# Patient Record
Sex: FEMALE | URBAN - METROPOLITAN AREA
[De-identification: names, ages, dates, MRNs, and addresses within clinical notes are randomized per-mention and may not be internally consistent; named-entity substitution may affect disease eponyms.]

---

## 2023-05-22 ENCOUNTER — OFFICE VISIT (OUTPATIENT)
Dept: URBAN - METROPOLITAN AREA CLINIC 13 | Facility: CLINIC | Age: 68
End: 2023-05-22
Payer: MEDICARE

## 2023-05-22 DIAGNOSIS — H35.373 PUCKERING OF MACULA, BILATERAL: Primary | ICD-10-CM

## 2023-05-22 DIAGNOSIS — H25.12 AGE-RELATED NUCLEAR CATARACT, LEFT EYE: ICD-10-CM

## 2023-05-22 DIAGNOSIS — Z96.1 PRESENCE OF INTRAOCULAR LENS: ICD-10-CM

## 2023-05-22 DIAGNOSIS — H43.813 VITREOUS DEGENERATION, BILATERAL: ICD-10-CM

## 2023-05-22 PROCEDURE — 92134 CPTRZ OPH DX IMG PST SGM RTA: CPT | Performed by: OPHTHALMOLOGY

## 2023-05-22 PROCEDURE — 99204 OFFICE O/P NEW MOD 45 MIN: CPT | Performed by: OPHTHALMOLOGY

## 2023-05-22 ASSESSMENT — INTRAOCULAR PRESSURE
OD: 10
OS: 13

## 2023-05-22 NOTE — IMPRESSION/PLAN
Impression: Puckering of macula, bilateral: H35.373. Bilateral.

OCT: 
OD: ERM 
OS: ERM Plan: Examination and OCT reveals an ERM which is distorting the macular contour. The diagnosis, natural history, and prognosis of epiretinal membranes, as well as the risks and benefits of vitrectomy with membrane peeling versus observation were discussed at length. Given the patient's current visual acuity and hindrance on activities of daily living, surgical correction was recommended. The patient understands that while the distortion should vielka, the final acuity, which can take months to achieve, may or may not be an improvement over baseline. 

PLAN: 25g PPV, ERM-ILM peel OD x ERM (30mins non-urgent)

## 2023-06-20 ENCOUNTER — POST-OPERATIVE VISIT (OUTPATIENT)
Dept: URBAN - METROPOLITAN AREA CLINIC 13 | Facility: CLINIC | Age: 68
End: 2023-06-20
Payer: MEDICARE

## 2023-06-20 DIAGNOSIS — H35.373 PUCKERING OF MACULA, BILATERAL: ICD-10-CM

## 2023-06-20 DIAGNOSIS — Z48.810 ENCOUNTER FOR SURGICAL AFTERCARE FOLLOWING SURGERY ON A SENSE ORGAN: Primary | ICD-10-CM

## 2023-06-20 PROCEDURE — 99024 POSTOP FOLLOW-UP VISIT: CPT | Performed by: OPHTHALMOLOGY

## 2023-06-20 ASSESSMENT — INTRAOCULAR PRESSURE
OD: 6
OS: 12

## 2023-06-20 NOTE — IMPRESSION/PLAN
Impression: S/P 25g PPV, ERM-ILM peel OD x ERM OD - 1 Day. Encounter for surgical aftercare following surgery on a sense organ  Z48.810. Plan: Pain is well controlled. The retina appears attached. The IOP is in good range. Patient is to use following drops: Prednisolone 1% 1 drop 4 times a day, Ofloxacin 0.3% 1 drop 4 times a day. No heavy lifting, strenuous activity, or bending for 2 weeks. Gas precautions. Patient is to follow up in 1 week for [facility] in surgical eye.

## 2023-06-27 ENCOUNTER — POST-OPERATIVE VISIT (OUTPATIENT)
Dept: URBAN - METROPOLITAN AREA CLINIC 13 | Facility: CLINIC | Age: 68
End: 2023-06-27
Payer: MEDICARE

## 2023-06-27 DIAGNOSIS — Z48.810 ENCOUNTER FOR SURGICAL AFTERCARE FOLLOWING SURGERY ON A SENSE ORGAN: Primary | ICD-10-CM

## 2023-06-27 DIAGNOSIS — H35.373 PUCKERING OF MACULA, BILATERAL: ICD-10-CM

## 2023-06-27 PROCEDURE — 99024 POSTOP FOLLOW-UP VISIT: CPT | Performed by: OPHTHALMOLOGY

## 2023-06-27 ASSESSMENT — INTRAOCULAR PRESSURE
OS: 13
OD: 17

## 2023-06-27 NOTE — IMPRESSION/PLAN
Impression: S/P 25g PPV, ERM-ILM peel OD x ERM OD - 8 Days. Puckering of macula, bilateral  H35.373. Plan: Taper PF 3-2-1. Stop Oflox.  

RTC 1 month POS DFE/OCT OD

## 2023-07-25 ENCOUNTER — POST-OPERATIVE VISIT (OUTPATIENT)
Dept: URBAN - METROPOLITAN AREA CLINIC 13 | Facility: CLINIC | Age: 68
End: 2023-07-25
Payer: MEDICARE

## 2023-07-25 DIAGNOSIS — H35.373 PUCKERING OF MACULA, BILATERAL: Primary | ICD-10-CM

## 2023-07-25 PROCEDURE — 99024 POSTOP FOLLOW-UP VISIT: CPT | Performed by: OPHTHALMOLOGY

## 2023-07-25 ASSESSMENT — INTRAOCULAR PRESSURE
OD: 10
OS: 10